# Patient Record
Sex: MALE | URBAN - METROPOLITAN AREA
[De-identification: names, ages, dates, MRNs, and addresses within clinical notes are randomized per-mention and may not be internally consistent; named-entity substitution may affect disease eponyms.]

---

## 2017-01-01 ENCOUNTER — TRANSFERRED RECORDS (OUTPATIENT)
Dept: HEALTH INFORMATION MANAGEMENT | Facility: CLINIC | Age: 62
End: 2017-01-01

## 2017-05-08 ENCOUNTER — TRANSFERRED RECORDS (OUTPATIENT)
Dept: HEALTH INFORMATION MANAGEMENT | Facility: CLINIC | Age: 62
End: 2017-05-08

## 2018-03-30 ENCOUNTER — OFFICE VISIT (OUTPATIENT)
Dept: FAMILY MEDICINE | Facility: CLINIC | Age: 63
End: 2018-03-30
Payer: COMMERCIAL

## 2018-03-30 ENCOUNTER — OFFICE VISIT (OUTPATIENT)
Dept: OPTOMETRY | Facility: CLINIC | Age: 63
End: 2018-03-30
Payer: COMMERCIAL

## 2018-03-30 VITALS
DIASTOLIC BLOOD PRESSURE: 80 MMHG | SYSTOLIC BLOOD PRESSURE: 156 MMHG | OXYGEN SATURATION: 97 % | TEMPERATURE: 97.9 F | HEIGHT: 67 IN | WEIGHT: 152 LBS | HEART RATE: 68 BPM | BODY MASS INDEX: 23.86 KG/M2

## 2018-03-30 DIAGNOSIS — R03.0 ELEVATED BLOOD PRESSURE READING WITHOUT DIAGNOSIS OF HYPERTENSION: ICD-10-CM

## 2018-03-30 DIAGNOSIS — T15.01XA: Primary | ICD-10-CM

## 2018-03-30 DIAGNOSIS — T15.91XA FOREIGN BODY OF RIGHT EYE, INITIAL ENCOUNTER: Primary | ICD-10-CM

## 2018-03-30 DIAGNOSIS — Z12.11 SCREEN FOR COLON CANCER: ICD-10-CM

## 2018-03-30 PROCEDURE — 99203 OFFICE O/P NEW LOW 30 MIN: CPT | Mod: 25 | Performed by: OPTOMETRIST

## 2018-03-30 PROCEDURE — 99203 OFFICE O/P NEW LOW 30 MIN: CPT | Mod: 25 | Performed by: PREVENTIVE MEDICINE

## 2018-03-30 PROCEDURE — 65205 REMOVE FOREIGN BODY FROM EYE: CPT | Performed by: PREVENTIVE MEDICINE

## 2018-03-30 PROCEDURE — 99173 VISUAL ACUITY SCREEN: CPT | Mod: 59 | Performed by: PREVENTIVE MEDICINE

## 2018-03-30 PROCEDURE — 65222 REMOVE FOREIGN BODY FROM EYE: CPT | Performed by: OPTOMETRIST

## 2018-03-30 RX ORDER — CITALOPRAM HYDROBROMIDE 20 MG/1
TABLET ORAL
Refills: 3 | COMMUNITY
Start: 2018-03-02 | End: 2018-08-27

## 2018-03-30 RX ORDER — CIPROFLOXACIN HYDROCHLORIDE 3.5 MG/ML
1 SOLUTION/ DROPS TOPICAL 4 TIMES DAILY
Qty: 1 ML | Refills: 0 | Status: SHIPPED | OUTPATIENT
Start: 2018-03-30 | End: 2018-04-04

## 2018-03-30 RX ORDER — KETOROLAC TROMETHAMINE 5 MG/ML
1 SOLUTION OPHTHALMIC 4 TIMES DAILY
Qty: 1 ML | Refills: 0 | Status: SHIPPED | OUTPATIENT
Start: 2018-03-30 | End: 2018-04-04

## 2018-03-30 ASSESSMENT — VISUAL ACUITY
OS_CC+: -2
OS_CC: 20/20
OD_CC+: -1
OD_CC: 20/25
METHOD: SNELLEN - LINEAR

## 2018-03-30 ASSESSMENT — PAIN SCALES - GENERAL: PAINLEVEL: MODERATE PAIN (5)

## 2018-03-30 ASSESSMENT — SLIT LAMP EXAM - LIDS
COMMENTS: NORMAL
COMMENTS: NORMAL

## 2018-03-30 NOTE — PATIENT INSTRUCTIONS
At [facility], we strive to deliver an exceptional experience to you, every time we see you.  If you receive a survey in the mail, please send us back your thoughts. We really do value your feedback.    Based on your medical history, these are the current health maintenance/preventive care services that you are due for (some may have been done at this visit.)  Health Maintenance Due   Topic Date Due     TETANUS IMMUNIZATION (SYSTEM ASSIGNED)  01/19/1973     HEPATITIS C SCREENING  01/19/1973     LIPID SCREEN Q5 YR MALE (SYSTEM ASSIGNED)  01/19/1990     COLON CANCER SCREEN (SYSTEM ASSIGNED)  01/19/2005     ADVANCE DIRECTIVE PLANNING Q5 YRS  01/19/2010         Suggested websites for health information:  Www.Lovejuice.BioGenerics : Up to date and easily searchable information on multiple topics.  Www.medlineplus.gov : medication info, interactive tutorials, watch real surgeries online  Www.familydoctor.org : good info from the Academy of Family Physicians  Www.cdc.gov : public health info, travel advisories, epidemics (H1N1)  Www.aap.org : children's health info, normal development, vaccinations  Www.health.[facility].[place].us : [place] dept of health, public health issues in [place], N1N1    Your care team:                            Family Medicine Internal Medicine   MD Flash Steven MD Shantel Branch-Fleming, MD Katya Georgiev PA-C Megan Hill, APRSUKUMAR Irving MD Pediatrics   JOSS Catalan, ZACKERY Rodriguez APRN CNP   MD Abby Campos MD Deborah Mielke, MD Kim Thein, APRN [facility]      Clinic hours: Monday - Thursday 7 am-7 pm; Fridays 7 am-5 pm.   Urgent care: Monday - Friday 11 am-9 pm; Saturday and Sunday 9 am-5 pm.  Pharmacy : Monday -Thursday 8 am-8 pm; Friday 8 am-6 pm; Saturday and Sunday 9 am-5 pm.     Clinic: [phone]   Pharmacy: [phone]

## 2018-03-30 NOTE — LETTER
3/30/2018         RE: Eleazar Riddle  [address]  [place] [address]        Dear Colleague,    Thank you for referring your patient, Eleazar Riddle, to the [facility]. Please see a copy of my visit note below.    Chief Complaint   Patient presents with     Eye Problem Right Eye       HPI    Last Eye Exam:  1/30/18   Informant(s):  per PT   Symptoms:     Redness   Foreign body sensation   Tearing   Photophobia      Duration:  3 days   Frequency:  Constant       Do you have eye pain now?:  Yes   Location:  OD   Pain Level:  Moderate Pain (5)   Pain Duration:  3 days   Pain Frequency:  Constant      Comments:  Side of face hurts from eye being irritated. Foreign body sensation, redness and tearing.  Light sensitive    Pt was grinding on Tues.  Stuff flew in eye.  Pt didn't notice problem till Wednesday.  Pt flushed and used generic tears.  Last used tears last night.      Does not wear contacts.             Medical, surgical and family histories reviewed and updated 3/30/2018.       OBJECTIVE: See Ophthalmology exam    ASSESSMENT:    ICD-10-CM    1. Acute foreign body of cornea, right, initial encounter T15.01XA ciprofloxacin (CILOXAN) 0.3 % ophthalmic solution     ketorolac (ACULAR) 0.5 % ophthalmic solution     REMVOVAL FB EYE, CORNEAL W SLIT LAMP      PLAN:     Patient Instructions   Use the 2 drops in your right eye 4 times a day as directed.    Your eye will be painful for 24 to 48 hours. Use over the counter pain medication and rest.    Seek care if symptoms are not better by Monday or worsen.         Again, thank you for allowing me to participate in the care of your patient.        Sincerely,        Hina Goncalves OD

## 2018-03-30 NOTE — PROGRESS NOTES
Chief Complaint   Patient presents with     Eye Problem Right Eye       HPI    Last Eye Exam:  1/30/18   Informant(s):  per PT   Symptoms:     Redness   Foreign body sensation   Tearing   Photophobia      Duration:  3 days   Frequency:  Constant       Do you have eye pain now?:  Yes   Location:  OD   Pain Level:  Moderate Pain (5)   Pain Duration:  3 days   Pain Frequency:  Constant      Comments:  Side of face hurts from eye being irritated. Foreign body sensation, redness and tearing.  Light sensitive    Pt was grinding on Tues.  Stuff flew in eye.  Pt didn't notice problem till Wednesday.  Pt flushed and used generic tears.  Last used tears last night.      Does not wear contacts.             Medical, surgical and family histories reviewed and updated 3/30/2018.       OBJECTIVE: See Ophthalmology exam    ASSESSMENT:    ICD-10-CM    1. Acute foreign body of cornea, right, initial encounter T15.01XA ciprofloxacin (CILOXAN) 0.3 % ophthalmic solution     ketorolac (ACULAR) 0.5 % ophthalmic solution     REMVOVAL FB EYE, CORNEAL W SLIT LAMP      PLAN:     Patient Instructions   Use the 2 drops in your right eye 4 times a day as directed.    Your eye will be painful for 24 to 48 hours. Use over the counter pain medication and rest.    Seek care if symptoms are not better by Monday or worsen.

## 2018-03-30 NOTE — PROGRESS NOTES
"  SUBJECTIVE:   Eleazar Riddle is a 63 year old male who presents to clinic today for the following health issues:      Eye(s) Problem      Duration: x 4 days    Description:  Location: right  Pain: YES  Redness: YES  Discharge: no     Accompanying signs and symptoms: none    History (Trauma, foreign body exposure,): FB    Precipitating or alleviating factors (contact use): None    Therapies tried and outcome: eye wash OTC pain  Was doing some grinding, some dust may have gone into eye  Red, irritated and watery  No contact lens use  No eye surgery  Recent eye exam, got new glasses   No diabetes      Problem list and histories reviewed & adjusted, as indicated.  Additional history: as documented    Patient Active Problem List   Diagnosis   (none) - all problems resolved or deleted     History reviewed. No pertinent surgical history.    Social History   Substance Use Topics     Smoking status: Current Every Day Smoker     Smokeless tobacco: Never Used     Alcohol use Yes     Family History   Problem Relation Age of Onset     Glaucoma No family hx of      Macular Degeneration No family hx of          Current Outpatient Prescriptions   Medication Sig Dispense Refill     citalopram (CELEXA) 20 MG tablet TK 1 T PO QD  3     No Known Allergies  BP Readings from Last 3 Encounters:   03/30/18 156/80    Wt Readings from Last 3 Encounters:   03/30/18 152 lb (68.9 kg)                  Labs reviewed in EPIC    Reviewed and updated as needed this visit by clinical staff  Tobacco  Allergies  Soc Hx      Reviewed and updated as needed this visit by Provider         ROS:  Constitutional, HEENT, cardiovascular, pulmonary, gi and gu systems are negative, except as otherwise noted.    OBJECTIVE:                                                    /80  Pulse 68  Temp 97.9  F (36.6  C) (Oral)  Ht 5' 7\" (1.702 m)  Wt 152 lb (68.9 kg)  SpO2 97%  BMI 23.81 kg/m2  Body mass index is 23.81 kg/(m^2).  GENERAL APPEARANCE: healthy and " alert  EYES: PERRL and extra ocular movements are intact  Right Eye: Conjunctival injection+, there is foreign body noted on the inferior portion of the cornea, I was able to remove part of it with a Q tip. No lid abnormalities.   NECK: no asymmetry, masses, or scars and trachea midline and normal to palpation  RESP: lungs clear to auscultation - no rales, rhonchi or wheezes  CV: regular rates and rhythm, normal S1 S2, no S3 or S4 and no murmur, click or rub  SKIN: no suspicious lesions or rashes  NEURO: Normal strength and tone, mentation intact and speech normal  PSYCH: mentation appears normal and affect normal/bright    Diagnostic test results:  Diagnostic Test Results:  No results found for this or any previous visit (from the past 24 hour(s)).     ASSESSMENT/PLAN:                                                    1. Foreign body of right eye, initial encounter  -Needs removal by EYE  - OPTOMETRY REFERRAL    2. Screen for colon cancer  -Has had colonoscopy done in 2017 with past PCP     3. Elevated blood pressure reading without diagnosis of hypertension  -Has had normal readings at home  -Will monitor, follow up if over 140/90      Follow up with Provider - as needed     Sobia Bose MD MPH    [facility]

## 2018-03-30 NOTE — MR AVS SNAPSHOT
"              After Visit Summary   3/30/2018    Eleazar Riddle    MRN: [id number]           Patient Information     Date Of Birth          1955        Visit Information        Provider Department      3/30/2018 12:20 PM Hina Goncalves OD [facility]        Today's Diagnoses     Acute foreign body of cornea, right, initial encounter    -  1      Care Instructions    Use the 2 drops in your right eye 4 times a day as directed.    Your eye will be painful for 24 to 48 hours. Use over the counter pain medication and rest.    Seek care if symptoms are not better by Monday or worsen.          Follow-ups after your visit        Who to contact     If you have questions or need follow up information about today's clinic visit or your schedule please contact [facility] directly at [phone].  Normal or non-critical lab and imaging results will be communicated to you by [employer], letter or phone within 4 business days after the clinic has received the results. If you do not hear from us within 7 days, please contact the clinic through MyChart or phone. If you have a critical or abnormal lab result, we will notify you by phone as soon as possible.  Submit refill requests through [facility] or call your pharmacy and they will forward the refill request to us. Please allow 3 business days for your refill to be completed.          Additional Information About Your Visit        MyChart Information     [employer] lets you send messages to your doctor, view your test results, renew your prescriptions, schedule appointments and more. To sign up, go to [email or web address] . Click on \"Log in\" on the left side of the screen, which will take you to the Welcome page. Then click on \"Sign up Now\" on the right side of the page.     You will be asked to enter the access code listed below, as well as some personal information. Please follow the directions to create your username and password.   "   Your access code is: [email or web address]  Expires: 2018  1:19 PM     Your access code will  in 90 days. If you need help or a new code, please call your [place] clinic or [phone].        Care EveryWhere ID     This is your Care EveryWhere ID. This could be used by other organizations to access your [place] medical records  [id number]         Blood Pressure from Last 3 Encounters:   18 156/80    Weight from Last 3 Encounters:   18 68.9 kg (152 lb)              We Performed the Following     REMVOVAL FB EYE, CORNEAL W SLIT LAMP          Today's Medication Changes          These changes are accurate as of 3/30/18  1:33 PM.  If you have any questions, ask your nurse or doctor.               Start taking these medicines.        Dose/Directions    ciprofloxacin 0.3 % ophthalmic solution   Commonly known as:  CILOXAN   Used for:  Acute foreign body of cornea, right, initial encounter   Started by:  Hina Goncalves, OD        Dose:  1 drop   Place 1 drop into the right eye 4 times daily for 5 days   Quantity:  1 mL   Refills:  0       ketorolac 0.5 % ophthalmic solution   Commonly known as:  ACULAR   Used for:  Acute foreign body of cornea, right, initial encounter   Started by:  Hina Goncalves, OD        Dose:  1 drop   Place 1 drop into the right eye 4 times daily for 5 days   Quantity:  1 mL   Refills:  0            Where to get your medicines      [facility] [address] [place] [address] AT [address], [facility] [address]     Phone:  [phone]     ciprofloxacin 0.3 % ophthalmic solution    ketorolac 0.5 % ophthalmic solution                Primary Care Provider Fax #    Physician No Ref-Primary [phone]       No address on file        Equal Access to Services     ADRIANO OCONNOR AH: Oj Hawkins, alexi mccarthy, katherine brush, toby sifuentes  ah. So [facility] [phone].    ATENCIÓN: Si habla cyn, tiene a alvares disposición servicios gratuitos de asistencia lingüística. [place] al [phone].    We comply with applicable federal civil rights laws and [place] laws. We do not discriminate on the basis of race, color, national origin, age, disability, sex, sexual orientation, or gender identity.            Thank you!     Thank you for choosing [facility]  for your care. Our goal is always to provide you with excellent care. Hearing back from our patients is one way we can continue to improve our services. Please take a few minutes to complete the written survey that you may receive in the mail after your visit with us. Thank you!             Your Updated Medication List - Protect others around you: Learn how to safely use, store and throw away your medicines at www.disposemymeds.org.          This list is accurate as of 3/30/18  1:33 PM.  Always use your most recent med list.                   Brand Name Dispense Instructions for use Diagnosis    ciprofloxacin 0.3 % ophthalmic solution    CILOXAN    1 mL    Place 1 drop into the right eye 4 times daily for 5 days    Acute foreign body of cornea, right, initial encounter       citalopram 20 MG tablet    celeXA     TK 1 T PO QD        ketorolac 0.5 % ophthalmic solution    ACULAR    1 mL    Place 1 drop into the right eye 4 times daily for 5 days    Acute foreign body of cornea, right, initial encounter

## 2018-03-30 NOTE — NURSING NOTE
VISION   Wears glasses: worn for testing  Tool used: Gonzalo   Right eye:        10/16 (20/32)   Left eye:          10/16 (20/32)   Both eyes:       10/12.5  Mike SINGLETON

## 2018-03-30 NOTE — MR AVS SNAPSHOT
After Visit Summary   3/30/2018    Eleazar Riddle    MRN: [id number]           Patient Information     Date Of Birth          1955        Visit Information        Provider Department      3/30/2018 11:20 AM Sobia Bose MD [facility]        Today's Diagnoses     Foreign body of right eye, initial encounter    -  1    Screen for colon cancer        Elevated blood pressure reading without diagnosis of hypertension          Care Instructions    At [facility], we strive to deliver an exceptional experience to you, every time we see you.  If you receive a survey in the mail, please send us back your thoughts. We really do value your feedback.    Based on your medical history, these are the current health maintenance/preventive care services that you are due for (some may have been done at this visit.)  Health Maintenance Due   Topic Date Due     TETANUS IMMUNIZATION (SYSTEM ASSIGNED)  01/19/1973     HEPATITIS C SCREENING  01/19/1973     LIPID SCREEN Q5 YR MALE (SYSTEM ASSIGNED)  01/19/1990     COLON CANCER SCREEN (SYSTEM ASSIGNED)  01/19/2005     ADVANCE DIRECTIVE PLANNING Q5 YRS  01/19/2010         Suggested websites for health information:  Www.Kout.Spare Backup : Up to date and easily searchable information on multiple topics.  Www.medlineplus.gov : medication info, interactive tutorials, watch real surgeries online  Www.familydoctor.org : good info from the Academy of Family Physicians  Www.cdc.gov : public health info, travel advisories, epidemics (H1N1)  Www.aap.org : children's health info, normal development, vaccinations  Www.health.[facility].mn.us : [place] dept of health, public health issues in [place], N1N1    Your care team:                            Family Medicine Internal Medicine   MD Flash Steven MD Shantel Branch-Fleming, MD Katya Georgiev PA-C Megan Hill, JOHANA Irving MD Pediatrics   JOSS Catalan CNP [place]  MD Abby Higgins CNP, MD Deborah Mielke, MD Kim Thein, APRN CNP      Clinic hours: Monday - Thursday 7 am-7 pm; Fridays 7 am-5 pm.   Urgent care: Monday - Friday 11 am-9 pm; Saturday and Sunday 9 am-5 pm.  Pharmacy : Monday -Thursday 8 am-8 pm; Friday 8 am-6 pm; Saturday and Sunday 9 am-5 pm.     Clinic: [phone]   Pharmacy: [phone]              Follow-ups after your visit        Additional Services     OPTOMETRY REFERRAL       Your provider has referred you to: FM[facility]: [facility] [phone]    [email or web address]    Please be aware that coverage of these services is subject to the terms and limitations of your health insurance plan.  Call member services at your health plan with any benefit or coverage questions.      Please bring the following with you to your appointment:    (1) Any X-Rays, CTs or MRIs which have been performed.  Contact the facility where they were done to arrange for  prior to your scheduled appointment.    (2) List of current medications  (3) This referral request   (4) Any documents/labs given to you for this referral                  Follow-up notes from your care team     Return if symptoms worsen or fail to improve.      Who to contact     If you have questions or need follow up information about today's clinic visit or your schedule please contact [facility] directly at [phone].  Normal or non-critical lab and imaging results will be communicated to you by MyChart, letter or phone within 4 business days after the clinic has received the results. If you do not hear from us within 7 days, please contact the clinic through MyChart or phone. If you have a critical or abnormal lab result, we will notify you by phone as soon as possible.  Submit refill requests through [facility] or call your pharmacy and they will forward the refill request to us.  "Please allow 3 business days for your refill to be completed.          Additional Information About Your Visit        MyChart Information     [employer] lets you send messages to your doctor, view your test results, renew your prescriptions, schedule appointments and more. To sign up, go to [email or web address] . Click on \"Log in\" on the left side of the screen, which will take you to the Welcome page. Then click on \"Sign up Now\" on the right side of the page.     You will be asked to enter the access code listed below, as well as some personal information. Please follow the directions to create your username and password.     Your access code is: [email or web address]  Expires: 2018  1:19 PM     Your access code will  in 90 days. If you need help or a new code, please call your [place] clinic or [phone].        Care EveryWhere ID     This is your Care EveryWhere ID. This could be used by other organizations to access your [place] medical records  [id number]        Your Vitals Were     Pulse Temperature Height Pulse Oximetry BMI (Body Mass Index)       68 97.9  F (36.6  C) (Oral) 5' 7\" (1.702 m) 97% 23.81 kg/m2        Blood Pressure from Last 3 Encounters:   18 156/80    Weight from Last 3 Encounters:   18 152 lb (68.9 kg)              We Performed the Following     OPTOMETRY REFERRAL          Today's Medication Changes          These changes are accurate as of 3/30/18  1:19 PM.  If you have any questions, ask your nurse or doctor.               Start taking these medicines.        Dose/Directions    ciprofloxacin 0.3 % ophthalmic solution   Commonly known as:  CILOXAN   Used for:  Acute foreign body of cornea, right, initial encounter   Started by:  Hina Goncalves, ELENA        Dose:  1 drop   Place 1 drop into the right eye 4 times daily for 5 days   Quantity:  1 mL   Refills:  0       ketorolac 0.5 % ophthalmic solution   Commonly known as:  ACULAR   Used for:  Acute foreign body of " cornea, right, initial encounter   Started by:  Hina Goncalves, OD        Dose:  1 drop   Place 1 drop into the right eye 4 times daily for 5 days   Quantity:  1 mL   Refills:  0            Where to get your medicines      These medications were sent to [id number] - [place], [place] - [address] AT [address], [place] [address]     Phone:  [phone]     ciprofloxacin 0.3 % ophthalmic solution    ketorolac 0.5 % ophthalmic solution                Primary Care Provider Fax #    Physician No Ref-Primary [phone]       No address on file        Equal Access to Services     [facility] : Hadii malgorzata min hadanatoliyo Sopatricia, waaxda luqadaha, qaybta kaalmada adejaviyada, toby sifuentes . So [facility] [phone].    ATENCIÓN: Si habla español, tiene a alvares disposición servicios gratuitos de asistencia lingüística. [facility] [phone].    We comply with applicable federal civil rights laws and [place] laws. We do not discriminate on the basis of race, color, national origin, age, disability, sex, sexual orientation, or gender identity.            Thank you!     Thank you for choosing [facility]  for your care. Our goal is always to provide you with excellent care. Hearing back from our patients is one way we can continue to improve our services. Please take a few minutes to complete the written survey that you may receive in the mail after your visit with us. Thank you!             Your Updated Medication List - Protect others around you: Learn how to safely use, store and throw away your medicines at www.disposemymeds.org.          This list is accurate as of 3/30/18  1:19 PM.  Always use your most recent med list.                   Brand Name Dispense Instructions for use Diagnosis    ciprofloxacin 0.3 % ophthalmic solution    CILOXAN    1 mL    Place 1 drop into the right eye 4 times daily for 5 days    Acute foreign  body of cornea, right, initial encounter       citalopram 20 MG tablet    celeXA     TK 1 T PO QD        ketorolac 0.5 % ophthalmic solution    ACULAR    1 mL    Place 1 drop into the right eye 4 times daily for 5 days    Acute foreign body of cornea, right, initial encounter

## 2018-03-30 NOTE — PATIENT INSTRUCTIONS
Use the 2 drops in your right eye 4 times a day as directed.    Your eye will be painful for 24 to 48 hours. Use over the counter pain medication and rest.    Seek care if symptoms are not better by Monday or worsen.

## 2018-08-27 ENCOUNTER — OFFICE VISIT (OUTPATIENT)
Dept: FAMILY MEDICINE | Facility: CLINIC | Age: 63
End: 2018-08-27
Payer: COMMERCIAL

## 2018-08-27 VITALS
DIASTOLIC BLOOD PRESSURE: 74 MMHG | BODY MASS INDEX: 23.49 KG/M2 | OXYGEN SATURATION: 97 % | HEART RATE: 68 BPM | TEMPERATURE: 98.1 F | RESPIRATION RATE: 20 BRPM | WEIGHT: 150 LBS | SYSTOLIC BLOOD PRESSURE: 132 MMHG

## 2018-08-27 DIAGNOSIS — K51.40 PSEUDOPOLYPOSIS OF COLON WITHOUT COMPLICATION, UNSPECIFIED PART OF COLON (H): ICD-10-CM

## 2018-08-27 DIAGNOSIS — Z11.59 NEED FOR HEPATITIS C SCREENING TEST: ICD-10-CM

## 2018-08-27 DIAGNOSIS — F17.200 SMOKER: ICD-10-CM

## 2018-08-27 DIAGNOSIS — Z00.00 ENCOUNTER FOR ROUTINE ADULT HEALTH EXAMINATION WITHOUT ABNORMAL FINDINGS: ICD-10-CM

## 2018-08-27 DIAGNOSIS — F41.9 ANXIETY: Primary | ICD-10-CM

## 2018-08-27 DIAGNOSIS — F10.21 ALCOHOLISM IN REMISSION (H): ICD-10-CM

## 2018-08-27 DIAGNOSIS — Z28.21 VACCINATION REFUSED BY PATIENT: ICD-10-CM

## 2018-08-27 DIAGNOSIS — Z13.6 CARDIOVASCULAR SCREENING; LDL GOAL LESS THAN 160: ICD-10-CM

## 2018-08-27 DIAGNOSIS — Z13.1 SCREENING FOR DIABETES MELLITUS: ICD-10-CM

## 2018-08-27 DIAGNOSIS — R91.1 PULMONARY NODULE: ICD-10-CM

## 2018-08-27 DIAGNOSIS — F19.11 DRUG ABUSE IN REMISSION (H): ICD-10-CM

## 2018-08-27 DIAGNOSIS — R03.0 ELEVATED BLOOD PRESSURE READING WITHOUT DIAGNOSIS OF HYPERTENSION: ICD-10-CM

## 2018-08-27 PROCEDURE — 99396 PREV VISIT EST AGE 40-64: CPT | Performed by: NURSE PRACTITIONER

## 2018-08-27 PROCEDURE — 99213 OFFICE O/P EST LOW 20 MIN: CPT | Mod: 25 | Performed by: NURSE PRACTITIONER

## 2018-08-27 RX ORDER — NICOTINE 21 MG/24HR
1 PATCH, TRANSDERMAL 24 HOURS TRANSDERMAL EVERY 24 HOURS
Qty: 15 PATCH | Refills: 0 | Status: SHIPPED | OUTPATIENT
Start: 2018-08-27 | End: 2019-02-27

## 2018-08-27 RX ORDER — CITALOPRAM HYDROBROMIDE 20 MG/1
20 TABLET ORAL DAILY
Qty: 90 TABLET | Refills: 3 | Status: SHIPPED | OUTPATIENT
Start: 2018-08-27 | End: 2019-02-27

## 2018-08-27 ASSESSMENT — ANXIETY QUESTIONNAIRES
6. BECOMING EASILY ANNOYED OR IRRITABLE: SEVERAL DAYS
1. FEELING NERVOUS, ANXIOUS, OR ON EDGE: SEVERAL DAYS
GAD7 TOTAL SCORE: 5
5. BEING SO RESTLESS THAT IT IS HARD TO SIT STILL: SEVERAL DAYS
2. NOT BEING ABLE TO STOP OR CONTROL WORRYING: SEVERAL DAYS
IF YOU CHECKED OFF ANY PROBLEMS ON THIS QUESTIONNAIRE, HOW DIFFICULT HAVE THESE PROBLEMS MADE IT FOR YOU TO DO YOUR WORK, TAKE CARE OF THINGS AT HOME, OR GET ALONG WITH OTHER PEOPLE: NOT DIFFICULT AT ALL
7. FEELING AFRAID AS IF SOMETHING AWFUL MIGHT HAPPEN: NOT AT ALL
3. WORRYING TOO MUCH ABOUT DIFFERENT THINGS: NOT AT ALL

## 2018-08-27 ASSESSMENT — PATIENT HEALTH QUESTIONNAIRE - PHQ9: 5. POOR APPETITE OR OVEREATING: SEVERAL DAYS

## 2018-08-27 ASSESSMENT — PAIN SCALES - GENERAL: PAINLEVEL: NO PAIN (0)

## 2018-08-27 NOTE — Clinical Note
Colonoscopy done on this date: 09/2017 (approximately), by this group: [facility], results were inflammatory polyp, internal hemorrhoids, follow up recommended 5 years.

## 2018-08-27 NOTE — MR AVS SNAPSHOT
After Visit Summary   8/27/2018    Eleazar Riddle    MRN: [id number]           Patient Information     Date Of Birth          1955        Visit Information        Provider Department      8/27/2018 6:20 PM Shalonda Bass APRN [facility]        Today's Diagnoses     Anxiety    -  1    Need for hepatitis C screening test        Smoker        Alcoholism in remission (H)        Drug abuse in remission        Elevated blood pressure reading without diagnosis of hypertension        CARDIOVASCULAR SCREENING; LDL GOAL LESS THAN 160        Screening for diabetes mellitus        Vaccination refused by patient        Encounter for routine adult health examination without abnormal findings          Care Instructions    Smoking:  -recommend the lung cancer screening.  It is done at [place].  You can call [phone] to schedule  -start with 21 mg nicotine patch every 24 hours x 2 weeks  -then use 14 mg nicotine patch every 24 hours x 2 weeks  -then use 7 mg daily until not needed any more    Remember to set a quit date!        Preventive Health Recommendations  Male Ages 50 - 64    Yearly exam:             See your health care provider every year in order to  o   Review health changes.   o   Discuss preventive care.    o   Review your medicines if your doctor has prescribed any.     Have a cholesterol test every 5 years, or more frequently if you are at risk for high cholesterol/heart disease.     Have a diabetes test (fasting glucose) every three years. If you are at risk for diabetes, you should have this test more often.     Have a colonoscopy at age 50, or have a yearly FIT test (stool test). These exams will check for colon cancer.      Talk with your health care provider about whether or not a prostate cancer screening test (PSA) is right for you.    You should be tested each year for STDs (sexually transmitted diseases), if you re at risk.     Shots: Get a flu shot  each year. Get a tetanus shot every 10 years.     Nutrition:    Eat at least 5 servings of fruits and vegetables daily.     Eat whole-grain bread, whole-wheat pasta and brown rice instead of white grains and rice.     Get adequate Calcium and Vitamin D.     Lifestyle    Exercise for at least 150 minutes a week (30 minutes a day, 5 days a week). This will help you control your weight and prevent disease.     Limit alcohol to one drink per day.     No smoking.     Wear sunscreen to prevent skin cancer.     See your dentist every six months for an exam and cleaning.     See your eye doctor every 1 to 2 years.  At [facility], we strive to deliver an exceptional experience to you, every time we see you.  If you receive a survey in the mail, please send us back your thoughts. We really do value your feedback.    Based on your medical history, these are the current health maintenance/preventive care services that you are due for (some may have been done at this visit.)  Health Maintenance Due   Topic Date Due     TETANUS IMMUNIZATION (SYSTEM ASSIGNED)  01/19/1973     HIV SCREEN (SYSTEM ASSIGNED)  01/19/1973     HEPATITIS C SCREENING  01/19/1973     LIPID SCREEN Q5 YR MALE (SYSTEM ASSIGNED)  01/19/1990     ADVANCE DIRECTIVE PLANNING Q5 YRS  01/19/2010       Suggested websites for health information:  Www.[email or web address] : Up to date and easily searchable information on multiple topics.  Www.medlineplus.gov : medication info, interactive tutorials, watch real surgeries online  Www.familydoctor.org : good info from the Academy of Family Physicians  Www.cdc.gov : public health info, travel advisories, epidemics (H1N1)  Www.aap.org : children's health info, normal development, vaccinations  Www.health[place]state[place]us : [place] dept of health, public health issues in [place], N1N1    Your care team:                            Family Medicine Internal Medicine   MD Flash Steven MD Shantel Branch-Fleming, MD     Susan Zahng, APRN CNP    Adebayo Irving MD Pediatrics   JOSS Catalan, CNP MD Josie Rodrigez APRN CNP   MD Abby Campos MD Deborah Mielke, MD Kim Thein, APRN [facility]      Clinic hours: Monday - Thursday 7 am-7 pm; Fridays 7 am-5 pm.   Urgent care: Monday - Friday 11 am-9 pm; Saturday and Sunday 9 am-5 pm.  Pharmacy : Monday -Thursday 8 am-8 pm; Friday 8 am-6 pm; Saturday and Sunday 9 am-5 pm.     Clinic: [phone]   Pharmacy: [phone]              Follow-ups after your visit        Your next 10 appointments already scheduled     Aug 30, 2018  9:00 AM CDT   [facility]    [address] [place] [id number] [phone]           Please do not eat 10-12 hours before your appointment if you are coming in fasting for labs on lipids, cholesterol, or glucose (sugar). This does not apply to pregnant women. Water, hot tea and black coffee (with nothing added) are okay. Do not drink other fluids, diet soda or chew gum.              Future tests that were ordered for you today     Open Future Orders        Priority Expected Expires Ordered    Lipid panel reflex to direct LDL Fasting Routine  8/27/2019 8/27/2018    GLUCOSE Routine  8/27/2019 8/27/2018    **TSH with free T4 reflex FUTURE anytime Routine 8/27/2018 8/27/2019 8/27/2018    **Hepatitis C Screen Reflex to RNA FUTURE anytime Routine 8/27/2018 8/27/2019 8/27/2018    CT Chest Lung Cancer Scrn Low Dose wo Routine  8/27/2019 8/27/2018            Who to contact     If you have questions or need follow up information about today's clinic visit or your schedule please contact [facility] directly at [phone].  Normal or non-critical lab and imaging results will be communicated to you by MyChart, letter or phone within 4 business days after the clinic has received the results. If  "you do not hear from us within 7 days, please contact the clinic through [employer] or phone. If you have a critical or abnormal lab result, we will notify you by phone as soon as possible.  Submit refill requests through [employer] or call your pharmacy and they will forward the refill request to us. Please allow 3 business days for your refill to be completed.          Additional Information About Your Visit        [employer] Information     [employer] lets you send messages to your doctor, view your test results, renew your prescriptions, schedule appointments and more. To sign up, go to [email or web address] . Click on \"Log in\" on the left side of the screen, which will take you to the Welcome page. Then click on \"Sign up Now\" on the right side of the page.     You will be asked to enter the access code listed below, as well as some personal information. Please follow the directions to create your username and password.     Your access code is: [email or web address]  Expires: 2018  6:38 PM     Your access code will  in 90 days. If you need help or a new code, please call your [place] clinic or [phone].        Care EveryWhere ID     This is your Care EveryWhere ID. This could be used by other organizations to access your [place] medical records  [id number]        Your Vitals Were     Pulse Temperature Respirations Pulse Oximetry BMI (Body Mass Index)       68 98.1  F (36.7  C) (Oral) 20 97% 23.49 kg/m2        Blood Pressure from Last 3 Encounters:   18 132/74   18 156/80    Weight from Last 3 Encounters:   18 150 lb (68 kg)   18 152 lb (68.9 kg)                 Today's Medication Changes          These changes are accurate as of 18  6:42 PM.  If you have any questions, ask your nurse or doctor.               Start taking these medicines.        Dose/Directions    * nicotine 21 MG/24HR 24 hr patch   Commonly known as:  NICODERM CQ   Used for:  Smoker   Started by:  Shalonda Bass " JOHANA Bowles CNP        Dose:  1 patch   Place 1 patch onto the skin every 24 hours   Quantity:  15 patch   Refills:  0       * nicotine 14 MG/24HR 24 hr patch   Commonly known as:  NICODERM CQ   Used for:  Smoker   Started by:  Shalonda Bass APRN CNP        Dose:  1 patch   Place 1 patch onto the skin every 24 hours   Quantity:  15 patch   Refills:  0       * nicotine 7 MG/24HR 24 hr patch   Commonly known as:  NICODERM CQ   Used for:  Smoker   Started by:  Shalonda Bass APRN CNP        Dose:  1 patch   Place 1 patch onto the skin every 24 hours   Quantity:  30 patch   Refills:  1       * Notice:  This list has 3 medication(s) that are the same as other medications prescribed for you. Read the directions carefully, and ask your doctor or other care provider to review them with you.      These medicines have changed or have updated prescriptions.        Dose/Directions    citalopram 20 MG tablet   Commonly known as:  celeXA   This may have changed:  See the new instructions.   Used for:  Anxiety   Changed by:  Shalonda Bass APRN CNP        Dose:  20 mg   Take 1 tablet (20 mg) by mouth daily   Quantity:  90 tablet   Refills:  3            Where to get your medicines      These medications were sent to [facility] [address] [place] [address], [facility] [address]     Phone:  [phone]     citalopram 20 MG tablet    nicotine 14 MG/24HR 24 hr patch    nicotine 21 MG/24HR 24 hr patch    nicotine 7 MG/24HR 24 hr patch                Primary Care Provider Fax #    Physician No Ref-Primary [phone]       No address on file        Equal Access to Services     [facility]: Hadii malgorzata hassan Sopatricia, waaxda luqadaha, qaybta kaalmada adejaviyakerri, toby matson. So [facility] [phone].    ATENCIÓN: Si habla español, tiene a alvares disposición servicios gratuitos de asistencia  lingüísticaFreddy Bean al [phone].    We comply with applicable federal civil rights laws and [place] laws. We do not discriminate on the basis of race, color, national origin, age, disability, sex, sexual orientation, or gender identity.            Thank you!     Thank you for choosing [facility]  for your care. Our goal is always to provide you with excellent care. Hearing back from our patients is one way we can continue to improve our services. Please take a few minutes to complete the written survey that you may receive in the mail after your visit with us. Thank you!             Your Updated Medication List - Protect others around you: Learn how to safely use, store and throw away your medicines at www.disposemymeds.org.          This list is accurate as of 8/27/18  6:42 PM.  Always use your most recent med list.                   Brand Name Dispense Instructions for use Diagnosis    citalopram 20 MG tablet    celeXA    90 tablet    Take 1 tablet (20 mg) by mouth daily    Anxiety       * nicotine 21 MG/24HR 24 hr patch    NICODERM CQ    15 patch    Place 1 patch onto the skin every 24 hours    Smoker       * nicotine 14 MG/24HR 24 hr patch    NICODERM CQ    15 patch    Place 1 patch onto the skin every 24 hours    Smoker       * nicotine 7 MG/24HR 24 hr patch    NICODERM CQ    30 patch    Place 1 patch onto the skin every 24 hours    Smoker       * Notice:  This list has 3 medication(s) that are the same as other medications prescribed for you. Read the directions carefully, and ask your doctor or other care provider to review them with you.

## 2018-08-27 NOTE — PATIENT INSTRUCTIONS
Smoking:  -recommend the lung cancer screening.  It is done at [place].  You can call [phone] to schedule  -start with 21 mg nicotine patch every 24 hours x 2 weeks  -then use 14 mg nicotine patch every 24 hours x 2 weeks  -then use 7 mg daily until not needed any more    Remember to set a quit date!        Preventive Health Recommendations  Male Ages 50 - 64    Yearly exam:             See your health care provider every year in order to  o   Review health changes.   o   Discuss preventive care.    o   Review your medicines if your doctor has prescribed any.     Have a cholesterol test every 5 years, or more frequently if you are at risk for high cholesterol/heart disease.     Have a diabetes test (fasting glucose) every three years. If you are at risk for diabetes, you should have this test more often.     Have a colonoscopy at age 50, or have a yearly FIT test (stool test). These exams will check for colon cancer.      Talk with your health care provider about whether or not a prostate cancer screening test (PSA) is right for you.    You should be tested each year for STDs (sexually transmitted diseases), if you re at risk.     Shots: Get a flu shot each year. Get a tetanus shot every 10 years.     Nutrition:    Eat at least 5 servings of fruits and vegetables daily.     Eat whole-grain bread, whole-wheat pasta and brown rice instead of white grains and rice.     Get adequate Calcium and Vitamin D.     Lifestyle    Exercise for at least 150 minutes a week (30 minutes a day, 5 days a week). This will help you control your weight and prevent disease.     Limit alcohol to one drink per day.     No smoking.     Wear sunscreen to prevent skin cancer.     See your dentist every six months for an exam and cleaning.     See your eye doctor every 1 to 2 years.  At [facility], we strive to deliver an exceptional experience to you, every time we see you.  If you receive a survey in the mail,  please send us back your thoughts. We really do value your feedback.    Based on your medical history, these are the current health maintenance/preventive care services that you are due for (some may have been done at this visit.)  Health Maintenance Due   Topic Date Due     TETANUS IMMUNIZATION (SYSTEM ASSIGNED)  01/19/1973     HIV SCREEN (SYSTEM ASSIGNED)  01/19/1973     HEPATITIS C SCREENING  01/19/1973     LIPID SCREEN Q5 YR MALE (SYSTEM ASSIGNED)  01/19/1990     ADVANCE DIRECTIVE PLANNING Q5 YRS  01/19/2010       Suggested websites for health information:  Www.[employer].org : Up to date and easily searchable information on multiple topics.  Www.medlineplus.gov : medication info, interactive tutorials, watch real surgeries online  Www.familydoctor.org : good info from the Academy of Family Physicians  Www.cdc.gov : public health info, travel advisories, epidemics (H1N1)  Www.aap.org : children's health info, normal development, vaccinations  Www.health.[facility].[place].us : [place] dept of health, public health issues in [place], N1N1    Your care team:                            Family Medicine Internal Medicine   MD Flash Steven MD Shantel Branch-Fleming, MD Katya Georgiev PA-C Megan Hill, APRN CNP    Adebayo Irving MD Pediatrics   Jaret Almanzar, PAAUSTIN Bass, MD Josie Emerson APRN CNP   MD Abby Campos MD Deborah Mielke, MD Kim Thein, APRN [facility]      Clinic hours: Monday - Thursday 7 am-7 pm; Fridays 7 am-5 pm.   Urgent care: Monday - Friday 11 am-9 pm; Saturday and Sunday 9 am-5 pm.  Pharmacy : Monday -Thursday 8 am-8 pm; Friday 8 am-6 pm; Saturday and Sunday 9 am-5 pm.     Clinic: [phone]   Pharmacy: [phone]

## 2018-08-27 NOTE — PROGRESS NOTES
"  SUBJECTIVE:   CC: Eleazar Riddle is an 63 year old male who presents for preventative health visit.     Healthy Habits:    Do you get at least three servings of calcium containing foods daily (dairy, green leafy vegetables, etc.)? Not applicable    Amount of exercise or daily activities, outside of work: not applicable    Problems taking medications regularly not applicable    Medication side effects: No    Have you had an eye exam in the past two years? yes    Do you see a dentist twice per year? no    Do you have sleep apnea, excessive snoring or daytime drowsiness?no  Colonoscopy done on this date: 2017 (approximately), by this group: [facility], results were inflammatory polyp, internal hemorrhoids, follow up recommended 5 years.     Was previous patient of [facility] for his \"whole life\".  Had labs and exam last year which, per patient, were normal.  I do not have records of these today.  YO signed.    Smokin.5 PPD x 50 years.  Starting to have chronic cough and shortness of breath when climbing stairs.  Has made multiple attempts to quit.  Did not like Zyban- increased anxiety, Chantix made him extremely irritable, has tried e-cigs and patches.  No attempts to quit in last five years.  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  Increased amount of smoking around that time.      CT chest in :  1.  Stable right lower lobe pulmonary nodule consistent with probably   benign etiology.  In order to confirm benign etiology consideration could   be given to performing an additional follow-up examination in six-month   intervals to confirm stability for at least two years.    2.  Atherosclerotic coronary artery disease.      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      Last colonoscopy at [facility] in .  Had bleeding internal hemorrhoids and 8 mm polyp at splenic fixture.  Recommendation was repeat exam in 5 years  Path results:  The polypectomy " demonstrates a fragment consistent with an inflammatory polyp with a denuded mucosal surface. There is crypt dropout, mild crypt architectural distortion and Paneth cell metaplasia present.  There is no evidence of dysplasia. These findings are indeterminant and are suggestive of chronic injury, however, these are not definitively diagnostic of inflammatory bowel disease. Clinical correlation recommended.    Today's PHQ-2 Score:   PHQ-2 ( 1999 Pfizer) 8/27/2018 3/30/2018   Q1: Little interest or pleasure in doing things 1 0   Q2: Feeling down, depressed or hopeless 0 0   PHQ-2 Score 1 0     PHQ-9 score:    PHQ-9 SCORE 8/27/2018   Total Score 1     GAD7 score: 5    Abuse: Current or Past(Physical, Sexual or Emotional)- No  Do you feel safe in your environment - Yes    Social History   Substance Use Topics     Smoking status: Current Every Day Smoker     Smokeless tobacco: Never Used     Alcohol use Yes      If you drink alcohol do you typically have >3 drinks per day or >7 drinks per week? Not Applicable                      Last PSA: No results found for: PSA    Reviewed orders with patient. Reviewed health maintenance and updated orders accordingly - Yes  BP Readings from Last 3 Encounters:   08/27/18 132/74   03/30/18 156/80    Wt Readings from Last 3 Encounters:   08/27/18 150 lb (68 kg)   03/30/18 152 lb (68.9 kg)                  Patient Active Problem List   Diagnosis     Anxiety     Smoker     Alcoholism in remission (H)     Drug abuse in remission     Elevated blood pressure reading without diagnosis of hypertension     CARDIOVASCULAR SCREENING; LDL GOAL LESS THAN 160     Pseudopolyposis of colon without complication, unspecified part of colon (H)     Pulmonary nodule     Past Surgical History:   Procedure Laterality Date     CARPAL TUNNEL RELEASE RT/LT Bilateral      COLONOSCOPY      2017,2010,2002     HERNIA REPAIR      x 2; inguinal     KNEE SURGERY         Social History   Substance Use Topics      Smoking status: Current Every Day Smoker     Packs/day: 1.50     Types: Cigarettes     Smokeless tobacco: Never Used     Alcohol use No      Comment: 27 years sober     Family History   Problem Relation Age of Onset     Glaucoma No family hx of      Macular Degeneration No family hx of      Diabetes No family hx of      HEART DISEASE No family hx of      Cancer No family hx of          Current Outpatient Prescriptions   Medication Sig Dispense Refill     citalopram (CELEXA) 20 MG tablet Take 1 tablet (20 mg) by mouth daily 90 tablet 3     nicotine (NICODERM CQ) 14 MG/24HR 24 hr patch Place 1 patch onto the skin every 24 hours 15 patch 0     nicotine (NICODERM CQ) 21 MG/24HR 24 hr patch Place 1 patch onto the skin every 24 hours 15 patch 0     nicotine (NICODERM CQ) 7 MG/24HR 24 hr patch Place 1 patch onto the skin every 24 hours 30 patch 1     No Known Allergies  No lab results found.     Reviewed and updated as needed this visit by clinical staff  Tobacco  Allergies  Meds  Med Hx  Surg Hx  Fam Hx  Soc Hx        Reviewed and updated as needed this visit by Provider  Tobacco  Allergies  Meds  Med Hx  Surg Hx  Fam Hx  Soc Hx       Past Medical History:   Diagnosis Date     Alcohol abuse     sober since 1990s     Drug abuse     sober since 1990s     GI bleed 1998    r/t alcohol use      Past Surgical History:   Procedure Laterality Date     CARPAL TUNNEL RELEASE RT/LT Bilateral      COLONOSCOPY      2017,2010,2002     HERNIA REPAIR      x 2; inguinal     KNEE SURGERY         ROS:  CONSTITUTIONAL: NEGATIVE for fever, chills, change in weight  INTEGUMENTARY/SKIN: NEGATIVE for worrisome rashes, moles or lesions  EYES: NEGATIVE for vision changes or irritation  ENT: NEGATIVE for ear, mouth and throat problems  RESP: NEGATIVE for significant cough or SOB  CV: NEGATIVE for chest pain, palpitations or peripheral edema  GI: NEGATIVE for nausea, abdominal pain, heartburn, or change in bowel habits   male: negative  for dysuria, hematuria, decreased urinary stream, erectile dysfunction, urethral discharge  MUSCULOSKELETAL: NEGATIVE for significant arthralgias or myalgia  NEURO: NEGATIVE for weakness, dizziness or paresthesias  PSYCHIATRIC: NEGATIVE for changes in mood or affect    OBJECTIVE:   /78 (BP Location: Right arm, Patient Position: Chair, Cuff Size: Adult Regular)  Pulse 68  Temp 98.1  F (36.7  C) (Oral)  Resp 20  Wt 150 lb (68 kg)  SpO2 97%  BMI 23.49 kg/m2  EXAM:  GENERAL: healthy, alert and no distress  EYES: Eyes grossly normal to inspection, PERRL and conjunctivae and sclerae normal  HENT: ear canals and TM's normal, nose and mouth without ulcers or lesions  NECK: no adenopathy, no asymmetry, masses, or scars and thyroid normal to palpation  RESP: lungs clear to auscultation - no rales, rhonchi or wheezes  CV: regular rate and rhythm, normal S1 S2, no S3 or S4, no murmur, click or rub, no peripheral edema and peripheral pulses strong  ABDOMEN: soft, nontender, no hepatosplenomegaly, no masses and bowel sounds normal  MS: no gross musculoskeletal defects noted, no edema  SKIN: no suspicious lesions or rashes  NEURO: Normal strength and tone, mentation intact and speech normal  PSYCH: mentation appears normal, affect normal/bright    Diagnostic Test Results:  No results found for this or any previous visit (from the past 24 hour(s)).    ASSESSMENT/PLAN:   1. Encounter for routine adult health examination without abnormal findings  Declines vaccines, YO filled out for previous clinic    2. Anxiety  Stable, refilled.  Started taking around the time he quit drugs and alcohol to help with irritability and anger.    - citalopram (CELEXA) 20 MG tablet; Take 1 tablet (20 mg) by mouth daily  Dispense: 90 tablet; Refill: 3    3. Smoker  Agrees to try patches.  See HPI.  Will also check CT scan.  History of small, likely benign pulm nodule on right side, last checked 2009.    - CT Chest Lung Cancer Scrn Low Dose  wo; Future  - nicotine (NICODERM CQ) 21 MG/24HR 24 hr patch; Place 1 patch onto the skin every 24 hours  Dispense: 15 patch; Refill: 0  - nicotine (NICODERM CQ) 14 MG/24HR 24 hr patch; Place 1 patch onto the skin every 24 hours  Dispense: 15 patch; Refill: 0  - nicotine (NICODERM CQ) 7 MG/24HR 24 hr patch; Place 1 patch onto the skin every 24 hours  Dispense: 30 patch; Refill: 1    4. Pulmonary nodule  As above.     5. Alcoholism in remission (H)  X 27 years    6. Drug abuse in remission  x27 years    7. Elevated blood pressure reading without diagnosis of hypertension  Patient declines treatment today.  Will work on stopping smoking and stress reduction  - **TSH with free T4 reflex FUTURE anytime; Future    8. Pseudopolyposis of colon without complication, unspecified part of colon (H)  Inflammatory polyp as seen on path [facility] from [facility].  Records from GI follow up requested.  From what I can see, follow up colonoscopy to be done in 5 years ().  Denies rectal bleeding, weight loss, diarrhea, or constipation.    9. CARDIOVASCULAR SCREENING; LDL GOAL LESS THAN 160  Will come back fasting, lab appointment scheduled today.   - Lipid panel reflex to direct LDL Fasting; Future    10. Need for hepatitis C screening test  - **Hepatitis C Screen Reflex to RNA FUTURE anytime; Future    11. Screening for diabetes mellitus  - GLUCOSE; Future    12. Vaccination refused by patient  Declines pneumonia, shingles vaccines.  States had TDAP last year.      COUNSELING:  Reviewed preventive health counseling, as reflected in patient instructions       Regular exercise       Healthy diet/nutrition       Vision screening       Safe sex practices/STD prevention       Consider Hep C screening for patients born between 1945 and        HIV screeninx in teen years, 1x in adult years, and at intervals if high risk       Colon cancer screening       Consider lung cancer screening for ages 55-80 years and 30 pack-year  "smoking history      BP Readings from Last 1 Encounters:   03/30/18 156/80     Estimated body mass index is 23.81 kg/(m^2) as calculated from the following:    Height as of 3/30/18: 5' 7\" (1.702 m).    Weight as of 3/30/18: 152 lb (68.9 kg).    BP Screening:   Last 3 BP Readings:    BP Readings from Last 3 Encounters:   08/27/18 132/74   03/30/18 156/80       The following was recommended to the patient:  Re-screen within 4 weeks and recommend lifestyle modifications       reports that he has been smoking.  He has never used smokeless tobacco.  Tobacco Cessation Action Plan: Pharmacotherapies : Nicotine patch  Self help information given to patient    Counseling Resources:  ATP IV Guidelines  Pooled Cohorts Equation Calculator  FRAX Risk Assessment  ICSI Preventive Guidelines    Patient Instructions     Smoking:  -recommend the lung cancer screening.  It is done at [facility].  You can call [phone] to schedule  -start with 21 mg nicotine patch every 24 hours x 2 weeks  -then use 14 mg nicotine patch every 24 hours x 2 weeks  -then use 7 mg daily until not needed any more    Remember to set a quit date!        Preventive Health Recommendations  Male Ages 50 - 64    Yearly exam:             See your health care provider every year in order to  o   Review health changes.   o   Discuss preventive care.    o   Review your medicines if your doctor has prescribed any.     Have a cholesterol test every 5 years, or more frequently if you are at risk for high cholesterol/heart disease.     Have a diabetes test (fasting glucose) every three years. If you are at risk for diabetes, you should have this test more often.     Have a colonoscopy at age 50, or have a yearly FIT test (stool test). These exams will check for colon cancer.      Talk with your health care provider about whether or not a prostate cancer screening test (PSA) is right for you.    You should be tested each year for STDs (sexually transmitted diseases), " if you re at risk.     Shots: Get a flu shot each year. Get a tetanus shot every 10 years.     Nutrition:    Eat at least 5 servings of fruits and vegetables daily.     Eat whole-grain bread, whole-wheat pasta and brown rice instead of white grains and rice.     Get adequate Calcium and Vitamin D.     Lifestyle    Exercise for at least 150 minutes a week (30 minutes a day, 5 days a week). This will help you control your weight and prevent disease.     Limit alcohol to one drink per day.     No smoking.     Wear sunscreen to prevent skin cancer.     See your dentist every six months for an exam and cleaning.     See your eye doctor every 1 to 2 years.  At [facility], we strive to deliver an exceptional experience to you, every time we see you.  If you receive a survey in the mail, please send us back your thoughts. We really do value your feedback.    Based on your medical history, these are the current health maintenance/preventive care services that you are due for (some may have been done at this visit.)  Health Maintenance Due   Topic Date Due     TETANUS IMMUNIZATION (SYSTEM ASSIGNED)  01/19/1973     HIV SCREEN (SYSTEM ASSIGNED)  01/19/1973     HEPATITIS C SCREENING  01/19/1973     LIPID SCREEN Q5 YR MALE (SYSTEM ASSIGNED)  01/19/1990     ADVANCE DIRECTIVE PLANNING Q5 YRS  01/19/2010       Suggested websites for health information:  Www.[employer]O.[employer] : Up to date and easily searchable information on multiple topics.  Www.medlineplus.gov : medication info, interactive tutorials, watch real surgeries online  Www.familydoctor.org : good info from the Academy of Family Physicians  Www.cdc.gov : public health info, travel advisories, epidemics (H1N1)  Www.aap.org : children's health info, normal development, vaccinations  Www.health.state.[place].us : [place] dept of health, public health issues in [place], N1N1    Your care team:                            Family Medicine Internal Medicine   Yuri Amador MD  MD Nan Montano MD Katya Georgiev PA-C Megan Hill, JOHANA Irving MD Pediatrics   JOSS Catalan, MD Josie Emerson APRMD Abby Hammer CNP, MD Deborah Mielke, MD Kim Thein, APRN CNP      Clinic hours: Monday - Thursday 7 am-7 pm; Fridays 7 am-5 pm.   Urgent care: Monday - Friday 11 am-9 pm; Saturday and Sunday 9 am-5 pm.  Pharmacy : Monday -Thursday 8 am-8 pm; Friday 8 am-6 pm; Saturday and Sunday 9 am-5 pm.     Clinic: [phone]   Pharmacy: [phone]          Dietary Guidelines for Americans, 2010  USDA's MyPlate  ASA Prophylaxis  Lung CA Screening    Shalonda Bass, JOHANA VIZCARRA  [facility]

## 2018-08-28 PROBLEM — K51.40: Status: ACTIVE | Noted: 2018-08-28

## 2018-08-28 PROBLEM — R91.1 PULMONARY NODULE: Status: ACTIVE | Noted: 2018-08-28

## 2018-08-28 PROBLEM — K51.419: Status: ACTIVE | Noted: 2018-08-28

## 2018-08-28 PROBLEM — K51.419: Status: RESOLVED | Noted: 2018-08-28 | Resolved: 2018-08-28

## 2018-08-28 ASSESSMENT — ANXIETY QUESTIONNAIRES: GAD7 TOTAL SCORE: 5

## 2018-08-28 ASSESSMENT — PATIENT HEALTH QUESTIONNAIRE - PHQ9: SUM OF ALL RESPONSES TO PHQ QUESTIONS 1-9: 1

## 2018-08-31 DIAGNOSIS — Z13.6 CARDIOVASCULAR SCREENING; LDL GOAL LESS THAN 160: ICD-10-CM

## 2018-08-31 DIAGNOSIS — R03.0 ELEVATED BLOOD PRESSURE READING WITHOUT DIAGNOSIS OF HYPERTENSION: ICD-10-CM

## 2018-08-31 DIAGNOSIS — Z13.1 SCREENING FOR DIABETES MELLITUS: ICD-10-CM

## 2018-08-31 DIAGNOSIS — Z11.59 NEED FOR HEPATITIS C SCREENING TEST: ICD-10-CM

## 2018-08-31 LAB
CHOLEST SERPL-MCNC: 173 MG/DL
GLUCOSE SERPL-MCNC: 97 MG/DL (ref 70–99)
HDLC SERPL-MCNC: 41 MG/DL
LDLC SERPL CALC-MCNC: 107 MG/DL
NONHDLC SERPL-MCNC: 132 MG/DL
TRIGL SERPL-MCNC: 126 MG/DL
TSH SERPL DL<=0.005 MIU/L-ACNC: 0.6 MU/L (ref 0.4–4)

## 2018-08-31 PROCEDURE — 84443 ASSAY THYROID STIM HORMONE: CPT | Performed by: NURSE PRACTITIONER

## 2018-08-31 PROCEDURE — 86803 HEPATITIS C AB TEST: CPT | Performed by: NURSE PRACTITIONER

## 2018-08-31 PROCEDURE — 80061 LIPID PANEL: CPT | Performed by: NURSE PRACTITIONER

## 2018-08-31 PROCEDURE — 82947 ASSAY GLUCOSE BLOOD QUANT: CPT | Performed by: NURSE PRACTITIONER

## 2018-08-31 PROCEDURE — 36415 COLL VENOUS BLD VENIPUNCTURE: CPT | Performed by: NURSE PRACTITIONER

## 2018-09-04 DIAGNOSIS — F17.200 SMOKER: Primary | ICD-10-CM

## 2018-09-04 DIAGNOSIS — R03.0 ELEVATED BLOOD PRESSURE READING WITHOUT DIAGNOSIS OF HYPERTENSION: ICD-10-CM

## 2018-09-04 LAB — HCV AB SERPL QL IA: NONREACTIVE

## 2018-09-04 RX ORDER — ATORVASTATIN CALCIUM 10 MG/1
10 TABLET, FILM COATED ORAL DAILY
Qty: 90 TABLET | Refills: 3 | Status: SHIPPED | OUTPATIENT
Start: 2018-09-04 | End: 2019-02-27

## 2018-09-16 ENCOUNTER — OFFICE VISIT (OUTPATIENT)
Dept: URGENT CARE | Facility: URGENT CARE | Age: 63
End: 2018-09-16
Payer: COMMERCIAL

## 2018-09-16 VITALS
HEART RATE: 78 BPM | WEIGHT: 146.8 LBS | RESPIRATION RATE: 16 BRPM | HEIGHT: 67 IN | BODY MASS INDEX: 23.04 KG/M2 | DIASTOLIC BLOOD PRESSURE: 78 MMHG | OXYGEN SATURATION: 97 % | TEMPERATURE: 97.9 F | SYSTOLIC BLOOD PRESSURE: 136 MMHG

## 2018-09-16 DIAGNOSIS — H53.9 VISION CHANGES: ICD-10-CM

## 2018-09-16 DIAGNOSIS — T15.91XA FOREIGN BODY, EYE, RIGHT, INITIAL ENCOUNTER: Primary | ICD-10-CM

## 2018-09-16 PROCEDURE — 65205 REMOVE FOREIGN BODY FROM EYE: CPT | Mod: 52 | Performed by: NURSE PRACTITIONER

## 2018-09-16 ASSESSMENT — PAIN SCALES - GENERAL: PAINLEVEL: NO PAIN (0)

## 2018-09-16 NOTE — MR AVS SNAPSHOT
"              After Visit Summary   2018    Eleazar Riddle    MRN: [id number]           Patient Information     Date Of Birth          1955        Visit Information        Provider Department      2018 10:15 AM Silvia Zelaya APRN CNP [facility]        Today's Diagnoses     Foreign body, eye, right, initial encounter    -  1    Vision changes           Follow-ups after your visit        Who to contact     If you have questions or need follow up information about today's clinic visit or your schedule please contact [facility] directly at [phone].  Normal or non-critical lab and imaging results will be communicated to you by [employer], letter or phone within 4 business days after the clinic has received the results. If you do not hear from us within 7 days, please contact the clinic through [employer] or phone. If you have a critical or abnormal lab result, we will notify you by phone as soon as possible.  Submit refill requests through [employer] or call your pharmacy and they will forward the refill request to us. Please allow 3 business days for your refill to be completed.          Additional Information About Your Visit        MyChart Information     [employer] lets you send messages to your doctor, view your test results, renew your prescriptions, schedule appointments and more. To sign up, go to [email or web address] . Click on \"Log in\" on the left side of the screen, which will take you to the Welcome page. Then click on \"Sign up Now\" on the right side of the page.     You will be asked to enter the access code listed below, as well as some personal information. Please follow the directions to create your username and password.     Your access code is: [email or web address]  Expires: 2018  6:38 PM     Your access code will  in 90 days. If you need help or a new code, please call your [facility] or [phone].        Care EveryWhere ID     This is " "your Care EveryWhere ID. This could be used by other organizations to access your [facility] medical records  [id number]        Your Vitals Were     Pulse Temperature Respirations Height Pulse Oximetry BMI (Body Mass Index)    78 97.9  F (36.6  C) (Oral) 16 5' 7\" (1.702 m) 97% 22.99 kg/m2       Blood Pressure from Last 3 Encounters:   09/16/18 136/78   08/27/18 132/74   03/30/18 156/80    Weight from Last 3 Encounters:   09/16/18 146 lb 12.8 oz (66.6 kg)   08/27/18 150 lb (68 kg)   03/30/18 152 lb (68.9 kg)              Today, you had the following     No orders found for display       Primary Care Provider Fax #    Physician No Ref-Primary [phone]       No address on file        Equal Access to Services     ADRIANO OCONNOR : Hadii malgorzata Hawkins, wakatie mccarthy, katherine kaalmakerri brush, toby sifuentes . So [facility] [phone].    ATENCIÓN: Si habla español, tiene a alvares disposición servicios gratuitos de asistencia lingüística. [place] al [phone].    We comply with applicable federal civil rights laws and [place] laws. We do not discriminate on the basis of race, color, national origin, age, disability, sex, sexual orientation, or gender identity.            Thank you!     Thank you for choosing [facility]  for your care. Our goal is always to provide you with excellent care. Hearing back from our patients is one way we can continue to improve our services. Please take a few minutes to complete the written survey that you may receive in the mail after your visit with us. Thank you!             Your Updated Medication List - Protect others around you: Learn how to safely use, store and throw away your medicines at www.disposemymeds.org.          This list is accurate as of 9/16/18 10:42 AM.  Always use your most recent med list.                   Brand Name Dispense Instructions for use Diagnosis    atorvastatin 10 MG tablet    LIPITOR    90 tablet    Take " 1 tablet (10 mg) by mouth daily    Elevated blood pressure reading without diagnosis of hypertension, Smoker       citalopram 20 MG tablet    celeXA    90 tablet    Take 1 tablet (20 mg) by mouth daily    Anxiety       * nicotine 21 MG/24HR 24 hr patch    NICODERM CQ    15 patch    Place 1 patch onto the skin every 24 hours    Smoker       * nicotine 14 MG/24HR 24 hr patch    NICODERM CQ    15 patch    Place 1 patch onto the skin every 24 hours    Smoker       * nicotine 7 MG/24HR 24 hr patch    NICODERM CQ    30 patch    Place 1 patch onto the skin every 24 hours    Smoker       * Notice:  This list has 3 medication(s) that are the same as other medications prescribed for you. Read the directions carefully, and ask your doctor or other care provider to review them with you.

## 2018-09-16 NOTE — PROGRESS NOTES
"SUBJECTIVE:  Chief Complaint:   Chief Complaint   Patient presents with     Eye Problem     History of Present Illness:  Eleazar Riddle is a 63 year old male who presents complaining of severe right eye pain, decreased vision, possible foreign body. A couple days ago was in a parking lot talking and wind blew sand or dirt into eye. It was a little irritated but didn't hurt much. Now today sudden visual changes, eye pain and swelling    Contact wearer : No    Past Medical History:   Diagnosis Date     Alcohol abuse     sober since 1990s     Drug abuse     sober since 1990s     GI bleed 1998    r/t alcohol use     Current Outpatient Prescriptions   Medication Sig Dispense Refill     atorvastatin (LIPITOR) 10 MG tablet Take 1 tablet (10 mg) by mouth daily 90 tablet 3     citalopram (CELEXA) 20 MG tablet Take 1 tablet (20 mg) by mouth daily 90 tablet 3     nicotine (NICODERM CQ) 14 MG/24HR 24 hr patch Place 1 patch onto the skin every 24 hours 15 patch 0     nicotine (NICODERM CQ) 21 MG/24HR 24 hr patch Place 1 patch onto the skin every 24 hours 15 patch 0     nicotine (NICODERM CQ) 7 MG/24HR 24 hr patch Place 1 patch onto the skin every 24 hours 30 patch 1        ROS:  Review of systems negative except as stated above.    OBJECTIVE:  /78 (BP Location: Left arm, Patient Position: Sitting, Cuff Size: Adult Regular)  Pulse 78  Temp 97.9  F (36.6  C) (Oral)  Resp 16  Ht 5' 7\" (1.702 m)  Wt 146 lb 12.8 oz (66.6 kg)  SpO2 97%  BMI 22.99 kg/m2  General: no acute distress  Eye exam: right eye abnormal findings: corneal foreign body noted, eye appears cloudy, film.  Heart: NORMAL - regular rate and rhythm without murmur.  Lungs: normal and clear to auscultation    ASSESSMENT:  (T15.91XA) Foreign body, eye, right, initial encounter  (primary encounter diagnosis)    Plan: Foreign body seen noted in eye, unable to remove  Will go to ER for removal. Possible slit lamp exam unavailable here.     (H53.9) Vision " changes    Plan: Right eye vision 20/50, cloudy.         JOHANA Briseno CNP

## 2019-02-27 ENCOUNTER — OFFICE VISIT (OUTPATIENT)
Dept: FAMILY MEDICINE | Facility: CLINIC | Age: 64
End: 2019-02-27

## 2019-02-27 VITALS
HEIGHT: 67 IN | TEMPERATURE: 98.2 F | BODY MASS INDEX: 23.39 KG/M2 | OXYGEN SATURATION: 95 % | DIASTOLIC BLOOD PRESSURE: 73 MMHG | WEIGHT: 149 LBS | HEART RATE: 88 BPM | SYSTOLIC BLOOD PRESSURE: 138 MMHG | RESPIRATION RATE: 20 BRPM

## 2019-02-27 DIAGNOSIS — M19.042 PRIMARY OSTEOARTHRITIS OF BOTH HANDS: Primary | ICD-10-CM

## 2019-02-27 DIAGNOSIS — M19.041 PRIMARY OSTEOARTHRITIS OF BOTH HANDS: Primary | ICD-10-CM

## 2019-02-27 LAB
CRP SERPL-MCNC: <2.9 MG/L (ref 0–8)
ERYTHROCYTE [SEDIMENTATION RATE] IN BLOOD BY WESTERGREN METHOD: 4 MM/H (ref 0–20)

## 2019-02-27 PROCEDURE — 86140 C-REACTIVE PROTEIN: CPT | Performed by: FAMILY MEDICINE

## 2019-02-27 PROCEDURE — 86038 ANTINUCLEAR ANTIBODIES: CPT | Performed by: FAMILY MEDICINE

## 2019-02-27 PROCEDURE — 85652 RBC SED RATE AUTOMATED: CPT | Performed by: FAMILY MEDICINE

## 2019-02-27 PROCEDURE — 36415 COLL VENOUS BLD VENIPUNCTURE: CPT | Performed by: FAMILY MEDICINE

## 2019-02-27 PROCEDURE — 86039 ANTINUCLEAR ANTIBODIES (ANA): CPT | Performed by: FAMILY MEDICINE

## 2019-02-27 PROCEDURE — 99214 OFFICE O/P EST MOD 30 MIN: CPT | Performed by: FAMILY MEDICINE

## 2019-02-27 PROCEDURE — 86431 RHEUMATOID FACTOR QUANT: CPT | Performed by: FAMILY MEDICINE

## 2019-02-27 PROCEDURE — 86200 CCP ANTIBODY: CPT | Performed by: FAMILY MEDICINE

## 2019-02-27 RX ORDER — DULOXETIN HYDROCHLORIDE 20 MG/1
20 CAPSULE, DELAYED RELEASE ORAL 2 TIMES DAILY
Qty: 60 CAPSULE | Refills: 11 | Status: SHIPPED | OUTPATIENT
Start: 2019-02-27 | End: 2019-04-09

## 2019-02-27 ASSESSMENT — MIFFLIN-ST. JEOR: SCORE: 1424.49

## 2019-02-27 ASSESSMENT — PAIN SCALES - GENERAL: PAINLEVEL: SEVERE PAIN (6)

## 2019-02-27 NOTE — PATIENT INSTRUCTIONS
At [facility], we strive to deliver an exceptional experience to you, every time we see you.  If you receive a survey in the mail, please send us back your thoughts. We really do value your feedback.    Based on your medical history, these are the current health maintenance/preventive care services that you are due for (some may have been done at this visit.)  Health Maintenance Due   Topic Date Due     HIV SCREEN (SYSTEM ASSIGNED)  01/19/1973     ZOSTER IMMUNIZATION (1 of 2) 01/19/2005     ADVANCE DIRECTIVE PLANNING Q5 YRS  01/19/2010     DTAP/TDAP/TD IMMUNIZATION (2 - Td) 06/29/2017     INFLUENZA VACCINE (1) 09/01/2018         Suggested websites for health information:  Www.[employer].org : Up to date and easily searchable information on multiple topics.  Www.medlineplus.gov : medication info, interactive tutorials, watch real surgeries online  Www.familydoctor.org : good info from the Academy of Family Physicians  Www.cdc.gov : public health info, travel advisories, epidemics (H1N1)  Www.aap.org : children's health info, normal development, vaccinations  Www.health.state.[place].us : [place] dept of health, public health issues in [place], N1N1    Your care team:                            Family Medicine Internal Medicine   MD Flash Steven MD Shantel Branch-Fleming, MD Katya Georgiev PA-C Nam Ho, MD Pediatrics   JOSS Catalan, ZACKERY Rodriguez APRN MD Abby Alfaro MD Deborah Mielke, MD Kim Thein, APRN [facility]      Clinic hours: Monday - Thursday 7 am-7 pm; Fridays 7 am-5 pm.   Urgent care: Monday - Friday 11 am-9 pm; Saturday and Sunday 9 am-5 pm.  Pharmacy : Monday -Thursday 8 am-8 pm; Friday 8 am-6 pm; Saturday and Sunday 9 am-5 pm.     Clinic: [phone]   Pharmacy: [phone]

## 2019-02-27 NOTE — PROGRESS NOTES
SUBJECTIVE:   Eleazar Riddle is a 64 year old male who presents to clinic today for the following health issues:      Joint Pain    Onset: years    Description:   Location: hands, knees and neck  Character: ache, stiffness    Intensity: moderate, severe    Progression of Symptoms: worse    Accompanying Signs & Symptoms:  Other symptoms: numbness right hand, headache every morning. Swelling in joints    History:   Previous similar pain: no       Precipitating factors:   Trauma or overuse: YES    Alleviating factors:  Improved by: nothing    Therapies Tried and outcome: excedrin, change of bed, sleeping couch, different pillows    Problem list and histories reviewed & adjusted, as indicated.  Additional history: as documented    Patient Active Problem List   Diagnosis     Anxiety     Smoker     Alcoholism in remission (H)     Drug abuse in remission     Elevated blood pressure reading without diagnosis of hypertension     CARDIOVASCULAR SCREENING; LDL GOAL LESS THAN 160     Pseudopolyposis of colon without complication, unspecified part of colon (H)     Pulmonary nodule     Past Surgical History:   Procedure Laterality Date     CARPAL TUNNEL RELEASE RT/LT Bilateral      COLONOSCOPY      2017,2010,2002     HERNIA REPAIR      x 2; inguinal     KNEE SURGERY         Social History     Tobacco Use     Smoking status: Current Every Day Smoker     Packs/day: 1.50     Types: Cigarettes     Smokeless tobacco: Never Used   Substance Use Topics     Alcohol use: No     Comment: 27 years sober     Family History   Problem Relation Age of Onset     Glaucoma No family hx of      Macular Degeneration No family hx of      Diabetes No family hx of      Heart Disease No family hx of      Cancer No family hx of            Reviewed and updated as needed this visit by clinical staff  Tobacco  Allergies  Meds  Med Hx  Surg Hx  Fam Hx  Soc Hx      Reviewed and updated as needed this visit by Provider         ROS:  Constitutional, HEENT,  "cardiovascular, pulmonary, GI, , musculoskeletal, neuro, skin, endocrine and psych systems are negative, except as otherwise noted.    OBJECTIVE:     /73 (BP Location: Left arm, Patient Position: Chair, Cuff Size: Adult Regular)   Pulse 88   Temp 98.2  F (36.8  C) (Oral)   Resp 20   Ht 1.702 m (5' 7\")   Wt 67.6 kg (149 lb)   SpO2 95%   BMI 23.34 kg/m    Body mass index is 23.34 kg/m .  GENERAL: healthy, alert and no distress  NECK: no adenopathy, no asymmetry, masses, or scars and thyroid normal to palpation  RESP: lungs clear to auscultation - no rales, rhonchi or wheezes  CV: regular rate and rhythm, normal S1 S2, no S3 or S4, no murmur, click or rub, no peripheral edema and peripheral pulses strong  ABDOMEN: soft, nontender, no hepatosplenomegaly, no masses and bowel sounds normal  MS: deformities of ipj's of both hands  Diagnostic Test Results:  none     ASSESSMENT/PLAN:     1. Primary osteoarthritis of both hands  R/o inflammatory d/o. Trial duloxetine to help with pain. Referred to Orthopedics for evaluation and recommendations.  - Cyclic Citrullinated Peptide Antibody IgG  - Rheumatoid factor  - Erythrocyte sedimentation rate auto  - CRP inflammation  - Anti Nuclear Mikayla IgG by IFA with Reflex  - DULoxetine (CYMBALTA) 20 MG capsule; Take 1 capsule (20 mg) by mouth 2 times daily  Dispense: 60 capsule; Refill: 11  - ORTHO  REFERRAL    See Patient Instructions    Adebayo Irving MD, MD  [facility]  "

## 2019-02-28 LAB
ANA PAT SER IF-IMP: ABNORMAL
ANA SER QL IF: ABNORMAL
ANA TITR SER IF: ABNORMAL {TITER}
CCP AB SER IA-ACNC: 1 U/ML
RHEUMATOID FACT SER NEPH-ACNC: <20 IU/ML (ref 0–20)

## 2019-04-08 DIAGNOSIS — F41.9 ANXIETY: ICD-10-CM

## 2019-04-08 NOTE — TELEPHONE ENCOUNTER
Spoke with patient and he said that since switching from Celexa to Cymbalta, he feels that his motivation has gotten much worse and he said he feels more depressed since starting the Cymbalta. Patient would like to try the Celexa again as he did not notice lack of motivation from that medication. Orders pended; please sign if appropriate.         Denise Tucker RN, BSN

## 2019-04-08 NOTE — TELEPHONE ENCOUNTER
Reason for Call:  Other call back     Detailed comments: Would like a med change - seen on 02/27 with  and meds were change but he would like that change as well because he is getting depress from it.    Phone Number Patient can be reached at: Cell number on file:    Telephone Information:   Mobile [phone]       Best Time: anytime    Can we leave a detailed message on this number? YES    Call taken on 4/8/2019 at 8:28 AM by Cinthia Crooks

## 2019-04-09 RX ORDER — CITALOPRAM HYDROBROMIDE 20 MG/1
20 TABLET ORAL DAILY
Qty: 90 TABLET | Refills: 3 | Status: SHIPPED | OUTPATIENT
Start: 2019-04-09

## 2024-01-20 ENCOUNTER — HOSPITAL ENCOUNTER (EMERGENCY)
Facility: CLINIC | Age: 69
End: 2024-01-20